# Patient Record
(demographics unavailable — no encounter records)

---

## 2024-10-29 NOTE — PHYSICAL EXAM
[de-identified] : General: No acute distress Mental: Alert and oriented x3 Eyes: Conjunctivitis not seen Chest: Symmetric chest rise, no audible wheezing Skin: Bilateral lower extremities absent from rashes and ulcers Abdomen: No distention  Bilateral knee: Skin: Clean, dry, intact  Inspection: Valgus malalignment, no masses, no swelling, small effusion.  Tenderness: no MJLT. no LJLT. No tenderness over the medial and lateral patella facets. No ttp medial/lateral epicondyle, patella tendon, tibial tubercle, pes anserinus, or proximal fibula.  ROM: -5 to 130 pain with deep flexion in both knees, crepitus Stability: Stable to varus and valgus, negative lachman. Negative anterior/posterior drawer.  Additional tests: Negative McMurrays test, negative Steinmann, Negative patellar grind test.   Strength: 5/5 Q/H/TA/GS/EHL, no atrophy  Neuro: Sensation intact to light touch throughout in dp/sp/tib/jack/saph nerve distributions Pulses: 2+ DP/PT pulses.  [de-identified] : Bilateral knee x-rays performed today. X-rays of the right knee including 4 views shows severe narrowing of the lateral joint space, bone-on-bone sclerosis and diffuse osteophytes, valgus alignment, patella at appropriate height and center position.  Kellgren-Mehrdad 4 X-rays of the left knee including 4 views shows severe narrowing of the lateral joint space, bone-on-bone sclerosis and diffuse osteophytes, valgus alignment.  Kellgren-Mehrdad 4

## 2024-10-29 NOTE — HISTORY OF PRESENT ILLNESS
[de-identified] : 80-year-old female presents for follow-up of bilateral knee degenerative arthritis. The pain substantially limits activities of daily living. Walking tolerance is reduced. Medication including NSAIDs, Tylenol, and activity modification have been minimally effective for a period lasting greater than three months in duration. Assistive devices and external support were not deemed by the patient to be helpful in improving their function.  She reports worsening pain in the left knee, with buckling, swelling, and some stiffness.  She is requesting repeat gel injections.

## 2024-10-29 NOTE — DISCUSSION/SUMMARY
[de-identified] : 80-year-old female with chronic bilateral knee pain due to advanced degenerative arthritis. I discussed the treatment of degenerative arthritis with the patient at length today. I described the spectrum of treatment from nonoperative modalities to total joint arthroplasty. Noninvasive and nonoperative treatment modalities include weight reduction, activity modification with low impact exercise, PRN use of acetaminophen or anti-inflammatory medication if tolerated, glucosamine/chondroitin supplements, and physical therapy. Further treatments can include corticosteroid injection and the use of hyaluronic acid injections. Definitive treatment can certainly include total joint arthroplasty also. The risks and benefits of each treatment options was discussed and all questions were answered.  We will start Euflexxa injections today bilateral knee. Injections performed as noted above. Follow-up 1 week

## 2024-10-29 NOTE — PHYSICAL EXAM
[de-identified] : General: No acute distress Mental: Alert and oriented x3 Eyes: Conjunctivitis not seen Chest: Symmetric chest rise, no audible wheezing Skin: Bilateral lower extremities absent from rashes and ulcers Abdomen: No distention  Bilateral knee: Skin: Clean, dry, intact  Inspection: Valgus malalignment, no masses, no swelling, small effusion.  Tenderness: no MJLT. no LJLT. No tenderness over the medial and lateral patella facets. No ttp medial/lateral epicondyle, patella tendon, tibial tubercle, pes anserinus, or proximal fibula.  ROM: -5 to 130 pain with deep flexion in both knees, crepitus Stability: Stable to varus and valgus, negative lachman. Negative anterior/posterior drawer.  Additional tests: Negative McMurrays test, negative Steinmann, Negative patellar grind test.   Strength: 5/5 Q/H/TA/GS/EHL, no atrophy  Neuro: Sensation intact to light touch throughout in dp/sp/tib/jack/saph nerve distributions Pulses: 2+ DP/PT pulses.  [de-identified] : Bilateral knee x-rays performed today. X-rays of the right knee including 4 views shows severe narrowing of the lateral joint space, bone-on-bone sclerosis and diffuse osteophytes, valgus alignment, patella at appropriate height and center position.  Kellgren-Mehrdad 4 X-rays of the left knee including 4 views shows severe narrowing of the lateral joint space, bone-on-bone sclerosis and diffuse osteophytes, valgus alignment.  Kellgren-Mehrdad 4

## 2024-10-29 NOTE — PROCEDURE
[de-identified] : Injection: Right knee joint Euflexxa #1 Indication: Arthritis   A discussion was had with the patient regarding this procedure and all questions were answered. All risks, benefits and alternatives were discussed. These included but were not limited to bleeding, infection, and allergic reaction. Alcohol was used to clean the skin, and betadine was used to sterilize and prep the area in the lateral aspect of the right knee. Ethyl chloride spray was then used as a topical anesthetic. A 22-gauge needle was used to inject 2cc of euflexxa into the knee. A sterile bandage was then applied. The patient tolerated the procedure well and there were no complications.  Injection: Left knee joint. Indication: Arthritis   A discussion was had with the patient regarding this procedure and all questions were answered. All risks, benefits and alternatives were discussed. These included but were not limited to bleeding, infection, and allergic reaction. Alcohol was used to clean the skin, and betadine was used to sterilize and prep the area in the lateral aspect of the left knee. Ethyl chloride spray was then used as a topical anesthetic. A 22-gauge needle was used to inject 4cc of Euflexxa into the knee. A sterile bandage was then applied. The patient tolerated the procedure well and there were no complications.  Lot #: [id number] Exp: 11/16/2025

## 2024-10-29 NOTE — DISCUSSION/SUMMARY
[de-identified] : 80-year-old female with chronic bilateral knee pain due to advanced degenerative arthritis. I discussed the treatment of degenerative arthritis with the patient at length today. I described the spectrum of treatment from nonoperative modalities to total joint arthroplasty. Noninvasive and nonoperative treatment modalities include weight reduction, activity modification with low impact exercise, PRN use of acetaminophen or anti-inflammatory medication if tolerated, glucosamine/chondroitin supplements, and physical therapy. Further treatments can include corticosteroid injection and the use of hyaluronic acid injections. Definitive treatment can certainly include total joint arthroplasty also. The risks and benefits of each treatment options was discussed and all questions were answered.  We will start Euflexxa injections today bilateral knee. Injections performed as noted above. Follow-up 1 week

## 2024-10-29 NOTE — HISTORY OF PRESENT ILLNESS
[de-identified] : 80-year-old female presents for follow-up of bilateral knee degenerative arthritis. The pain substantially limits activities of daily living. Walking tolerance is reduced. Medication including NSAIDs, Tylenol, and activity modification have been minimally effective for a period lasting greater than three months in duration. Assistive devices and external support were not deemed by the patient to be helpful in improving their function.  She reports worsening pain in the left knee, with buckling, swelling, and some stiffness.  She is requesting repeat gel injections.

## 2024-10-29 NOTE — PROCEDURE
Continue psychology visits  Psychiatrist located in [facility]   Stable condition   [de-identified] : Injection: Right knee joint Euflexxa #1 Indication: Arthritis   A discussion was had with the patient regarding this procedure and all questions were answered. All risks, benefits and alternatives were discussed. These included but were not limited to bleeding, infection, and allergic reaction. Alcohol was used to clean the skin, and betadine was used to sterilize and prep the area in the lateral aspect of the right knee. Ethyl chloride spray was then used as a topical anesthetic. A 22-gauge needle was used to inject 2cc of euflexxa into the knee. A sterile bandage was then applied. The patient tolerated the procedure well and there were no complications.  Injection: Left knee joint. Indication: Arthritis   A discussion was had with the patient regarding this procedure and all questions were answered. All risks, benefits and alternatives were discussed. These included but were not limited to bleeding, infection, and allergic reaction. Alcohol was used to clean the skin, and betadine was used to sterilize and prep the area in the lateral aspect of the left knee. Ethyl chloride spray was then used as a topical anesthetic. A 22-gauge needle was used to inject 4cc of Euflexxa into the knee. A sterile bandage was then applied. The patient tolerated the procedure well and there were no complications.  Lot #: [id number] Exp: 11/16/2025

## 2024-11-05 NOTE — PROCEDURE
[de-identified] : Injection: Right knee joint Euflexxa #2 Indication: Arthritis   A discussion was had with the patient regarding this procedure and all questions were answered. All risks, benefits and alternatives were discussed. These included but were not limited to bleeding, infection, and allergic reaction. Alcohol was used to clean the skin, and betadine was used to sterilize and prep the area in the lateral aspect of the right knee. Ethyl chloride spray was then used as a topical anesthetic. A 22-gauge needle was used to inject 2cc of Euflexxa into the knee. A sterile bandage was then applied. The patient tolerated the procedure well and there were no complications.  Injection: Left knee joint Euflexxa #2 Indication: Arthritis   A discussion was had with the patient regarding this procedure and all questions were answered. All risks, benefits and alternatives were discussed. These included but were not limited to bleeding, infection, and allergic reaction. Alcohol was used to clean the skin, and betadine was used to sterilize and prep the area in the lateral aspect of the left knee. Ethyl chloride spray was then used as a topical anesthetic. A 22-gauge needle was used to inject 2cc of Euflexxa into the knee. A sterile bandage was then applied. The patient tolerated the procedure well and there were no complications.

## 2024-11-19 NOTE — PROCEDURE
[de-identified] : Injection: Right knee joint Euflexxa #3 Indication: Arthritis   A discussion was had with the patient regarding this procedure and all questions were answered. All risks, benefits and alternatives were discussed. These included but were not limited to bleeding, infection, and allergic reaction. Alcohol was used to clean the skin, and betadine was used to sterilize and prep the area in the lateral aspect of the right knee. Ethyl chloride spray was then used as a topical anesthetic. A 22-gauge needle was used to inject 2cc of Euflexxa into the knee. A sterile bandage was then applied. The patient tolerated the procedure well and there were no complications.  Injection: Left knee joint Euflexxa #3 Indication: Arthritis   A discussion was had with the patient regarding this procedure and all questions were answered. All risks, benefits and alternatives were discussed. These included but were not limited to bleeding, infection, and allergic reaction. Alcohol was used to clean the skin, and betadine was used to sterilize and prep the area in the lateral aspect of the left knee. Ethyl chloride spray was then used as a topical anesthetic. A 22-gauge needle was used to inject 2cc of Euflexxa into the knee. A sterile bandage was then applied. The patient tolerated the procedure well and there were no complications.

## 2025-01-09 NOTE — HISTORY OF PRESENT ILLNESS
[de-identified] : 80 years old female presents to establish care new PCP, she use to be seen by DR MONAHAN, [place] for Hebrew speaking physician; refers h/o HTN, Alzheimer's , OA, offers no complaints today

## 2025-01-09 NOTE — HEALTH RISK ASSESSMENT
[Fair] :  ~his/her~ mood as fair [No] : No [No falls in past year] : Patient reported no falls in the past year [0] : 2) Feeling down, depressed, or hopeless: Not at all (0) [PHQ-2 Negative - No further assessment needed] : PHQ-2 Negative - No further assessment needed [Never] : Never [NO] : No [Patient reported mammogram was normal] : Patient reported mammogram was normal [Patient reported colonoscopy was normal] : Patient reported colonoscopy was normal [HIV test declined] : HIV test declined [Hepatitis C test declined] : Hepatitis C test declined [Behavioral] : behavioral [With Family] : lives with family [Retired] : retired [High School] : high school [] :  [# Of Children ___] : has [unfilled] children [Feels Safe at Home] : Feels safe at home [Reports changes in vision] : Reports changes in vision [Smoke Detector] : smoke detector [Carbon Monoxide Detector] : carbon monoxide detector [Seat Belt] :  uses seat belt [[id number]] : 0 [Change in mental status noted] : No change in mental status noted [Sexually Active] : not sexually active [Reports changes in hearing] : Reports no changes in hearing [Reports changes in dental health] : Reports no changes in dental health [MammogramDate] : 10/2024 [ColonoscopyDate] : 01/2021 [de-identified] : julia ANDRE [de-identified] : julia ANDRE

## 2025-01-09 NOTE — PLAN
[FreeTextEntry1] : 1. annual physical exam * routine general labs done * age appropriate health maintenance recommendations reviewed, discussed including healthy diet, regular exercise 2. HTN * c/w Irbesartan 300 mg, refilled 3. Alzheimer's disease * stable on donepezil and memantine, to continue * f/u with neurologist 4. breast cancer screening UTD with mammogram 5. colon cancer screening * FIT test * f/u in six months

## 2025-03-13 NOTE — CARDIOLOGY SUMMARY
[de-identified] : Sinus Rhythm  Low voltage in precordial leads. -RSR(V1) -nondiagnostic. [de-identified] : 2023 pharm stress normal SPECT  10/2024  Normal myocardial perfusion scan, with no evidence of infarction or inducible ischemia. [de-identified] : 3/2023 normal LV function  9/2024 normal LV function.

## 2025-03-13 NOTE — REVIEW OF SYSTEMS
[Dyspnea on exertion] : dyspnea during exertion [Heartburn] : heartburn [Anxiety] : anxiety [Negative] : Heme/Lymph

## 2025-03-13 NOTE — PHYSICAL EXAM
[Normal Rate] : normal [Rhythm Regular] : regular [Normal S1] : normal S1 [Normal S2] : normal S2 [No Gallop] : no gallop heard [I] : a grade 1 [No Pitting Edema] : no pitting edema present [1+] : left 1+ [Left Carotid Bruit] : no bruit heard over the left carotid [Right Carotid Bruit] : no bruit heard over the right carotid [No Edema] : no edema [No Cyanosis] : no cyanosis [Normal] : moves all extremities, no focal deficits, normal speech [Appears Anxious] : appears anxious [de-identified] : walks with cane [de-identified] : anxious

## 2025-03-13 NOTE — HISTORY OF PRESENT ILLNESS
[FreeTextEntry1] : 80 years old, with a history of hypertension, hyperlipidemia, dementia, on donepezil and memantine, venous insufficiency, for which she is status post prior venous stenting presents for a followup visit.    She is Irish speaking primarly. She is a poor historian overall.  Since her last visit more headaches and notes that her BP has been more elevated at times with SBP 170s.  At times she has palpitations when her pressure is high. She has a baseline general anxiety which at times worsens. She   denies any chest pain, PND, orthopnea, lower extremity edema, near syncope, syncope, stroke like symptoms. She has FORD with stairs which is unchanged.    Her lower extremity edema has remained stable if not slightly improved. Medication reconciliation performed. She is compliant with her medications.

## 2025-03-13 NOTE — DISCUSSION/SUMMARY
[FreeTextEntry1] : 80 year woman with a history as listed presents for a followup cardiac evaluation.  Cecilia is complaining of high BP and palpitations. Much of this may be reactive to her underlying anxiety. I will start her on Toprol 50mg Qday.  She will continue Irbesartan. She will try to maintain a BP log at home. Reducing dietary salt intake advised.  She had a negative nuclear stress in 10/2024.  She will followup with psych.  Exercise and diet counseling was performed in order to reduce her future cardiovascular risk. She will followup with me in 4-6 months or sooner if necessary.  [EKG obtained to assist in diagnosis and management of assessed problem(s)] : EKG obtained to assist in diagnosis and management of assessed problem(s)

## 2025-03-18 NOTE — HISTORY OF PRESENT ILLNESS
[FreeTextEntry1] :  80 years old female with HTN, Alzheimer's , OA (knee, shoulders), PAD s/p stent in right LE, GERD   Pt was seen 2/2025  Patient sees ortho for knee injection for knee OA, She reports she was told she has knee OA and needs surgery per patient.  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 with mild adjacent bursitis, partial tear at distal insertion of 4th plantar plate" - was told she needs surgery  Patient also seeing pain management for spine OA, reports had epidurals  Also reports shoulder pain due to previous rotator cuff injury  Reports numbness and tingling in her fingers at night   Patient denies joint pains, joint swelling, joint erythema/warmth, fever, chills, weight loss, nasopharyngeal ulcers, chest pain, abdominal pain, , cough, SOB, , rash, , Raynaud's, alopecia, dry mouth, dry skin, headaches, eye pain/redness, vision changes, myalgias, muscle weakness, , miscarriages, Hx of DVT/PEs.   Labs    2/2025  Labs  Neg NASIMA, RF, CCP, SSA, SSB, Santo, RNP, C3, C4,   ESR 32 normal for age, CRP <3  dsDNA 20  Urine pr/Cr 0.3      Imaging:  Xr shoulders, hand/wrists  Impression: Shoulders: No acute fracture or dislocation. Joint spaces are maintained. Hands/wrists: No acute fracture or dislocation. Severe bilateral basilar osteoarthrosis. Mild bilateral STT arthrosis. Mild joint space narrowing of the interphalangeal articulations. No osseous erosions or periosteal reaction.

## 2025-03-18 NOTE — PHYSICAL EXAM
[TextEntry] : GENERAL: Appears in no acute distress HEENT: EOMI. No conjunctival erythema. Moist mucous membranes. No nasopharyngeal ulcers NECK: Supple, no cervical lymphadenopathy CARDIOVASCULAR: RRR. S1, S2 auscultated. PULMONARY: Clear to auscultation b/l, MSK: No active synovitis, , erythema, or warmth. No joint tenderness to palpation. Flexion deformity 5th digit b/l No crepitus. ROM of shoulders limited due to pain SKIN: No lesions or rashes NEURO: No focal deficits. Motor strength 5/5 in major muscle groups of b/l UE and LE. Sensation to soft touch intact in major dermatomes of b/l UE and LE. PSYCH: Normal affect and thought process.

## 2025-03-18 NOTE — ASSESSMENT
[FreeTextEntry1] :  80 years old female with HTN, Alzheimer's , OA (knee, shoulders), PAD s/p stent in right LE, GERD   Patient sees ortho for knee injection for knee OA, She reports she was told she has knee OA and needs surgery per patient.  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 Based on existing labs, patient does not have anemia, leukopenia, thrombocytopenia, kidney disease. Exam without synovitis, rashes, changes of Raynauds.  Work up for underlying CTD nonrevealing. dsDNA low positive of no clinical significance. There is no suspicion for underlying CTD.  Advised better control of bp with her PCP/cardiologist (has some mild proteinuria which is likely 2/2 to high bp) I suspect pain is from OA and component myofascial pain as well  She is seeing ortho for knee OA and has had injection recently. Advised to also discuss shoulder OA with them (seen along with rotator cuff tear in MRI 2023)  She is seeing foot ortho for neuroma /partial tear at distal insertion of 4th plantar plate in right side - was told she might need surgery  Last time given PT for shoulder - encouraged to do PT for her shoulders Hand OA- paraffin wax, squeeze ball  Follow up PRN   Total time spent in review of patient history, clinical exam, management, counseling, and plan of care:  30min

## 2025-04-29 NOTE — PROCEDURE
[de-identified] : Injection: Right knee joint Euflexxa #1 Indication: Arthritis   A discussion was had with the patient regarding this procedure and all questions were answered. All risks, benefits and alternatives were discussed. These included but were not limited to bleeding, infection, and allergic reaction. Alcohol was used to clean the skin, and betadine was used to sterilize and prep the area in the lateral aspect of the right knee. Ethyl chloride spray was then used as a topical anesthetic. A 22-gauge needle was used to inject 2cc of Euflexxa into the knee. A sterile bandage was then applied. The patient tolerated the procedure well and there were no complications.  Injection: Left knee joint Euflexxa #1 Indication: Arthritis   A discussion was had with the patient regarding this procedure and all questions were answered. All risks, benefits and alternatives were discussed. These included but were not limited to bleeding, infection, and allergic reaction. Alcohol was used to clean the skin, and betadine was used to sterilize and prep the area in the lateral aspect of the left knee. Ethyl chloride spray was then used as a topical anesthetic. A 22-gauge needle was used to inject 2cc of Euflexxa into the knee. A sterile bandage was then applied. The patient tolerated the procedure well and there were no complications.  Lot: [id number] EXP: 04/19/2026

## 2025-04-29 NOTE — PHYSICAL EXAM
[Antalgic] : antalgic [Cane] : ambulates with cane [de-identified] : General: No acute distress Mental: Alert and oriented x3 Eyes: Conjunctivitis not seen Chest: Symmetric chest rise, no audible wheezing Skin: Bilateral lower extremities absent from rashes and ulcers Abdomen: No distention  Bilateral knee: Skin: Clean, dry, intact  Inspection: Valgus malalignment, no masses, no swelling, small effusion.  Tenderness: no MJLT. no LJLT. No tenderness over the medial and lateral patella facets. No ttp medial/lateral epicondyle, patella tendon, tibial tubercle, pes anserinus, or proximal fibula.  ROM: -5 to 130 pain with deep flexion in both knees, crepitus Stability: Stable to varus and valgus, negative lachman. Negative anterior/posterior drawer.  Additional tests: Negative McMurrays test, negative Steinmann, Negative patellar grind test.   Strength: 5/5 Q/H/TA/GS/EHL, no atrophy  Neuro: Sensation intact to light touch throughout in dp/sp/tib/jack/saph nerve distributions Pulses: 2+ DP/PT pulses.

## 2025-04-29 NOTE — HISTORY OF PRESENT ILLNESS
[de-identified] : 80-year-old female presents for follow-up of bilateral knee degenerative arthritis. The pain substantially limits activities of daily living. Walking tolerance is reduced. Medication including NSAIDs, Tylenol, and activity modification have been minimally effective for a period lasting greater than three months in duration. Assistive devices and external support were not deemed by the patient to be helpful in improving their function.  She reports worsening pain in the left knee, with buckling, swelling, and some stiffness.  She has had relief of pain with prior gel injections.  She does not want any cortisone injections.

## 2025-04-29 NOTE — DISCUSSION/SUMMARY
[de-identified] : 80-year-old female with chronic bilateral knee pain due to advanced degenerative arthritis and valgus alignment. I discussed the treatment of degenerative arthritis with the patient at length today. I described the spectrum of treatment from nonoperative modalities to total joint arthroplasty. Noninvasive and nonoperative treatment modalities include weight reduction, activity modification with low impact exercise, PRN use of acetaminophen or anti-inflammatory medication if tolerated, glucosamine/chondroitin supplements, and physical therapy. Further treatments can include corticosteroid injection and the use of hyaluronic acid injections. Definitive treatment can certainly include total joint arthroplasty also. The risks and benefits of each treatment options was discussed and all questions were answered.  She would like to continue nonoperative management. We will start Euflexxa injections today bilateral knee. Injections performed as noted above. Follow-up 1 week

## 2025-05-06 NOTE — PROCEDURE
[de-identified] : Injection: Right knee joint Euflexxa #2 Indication: Arthritis   A discussion was had with the patient regarding this procedure and all questions were answered. All risks, benefits and alternatives were discussed. These included but were not limited to bleeding, infection, and allergic reaction. Alcohol was used to clean the skin, and betadine was used to sterilize and prep the area in the lateral aspect of the right knee. Ethyl chloride spray was then used as a topical anesthetic. A 22-gauge needle was used to inject 2cc of Euflexxa into the knee. A sterile bandage was then applied. The patient tolerated the procedure well and there were no complications.  Injection: Left knee joint Euflexxa #2 Indication: Arthritis   A discussion was had with the patient regarding this procedure and all questions were answered. All risks, benefits and alternatives were discussed. These included but were not limited to bleeding, infection, and allergic reaction. Alcohol was used to clean the skin, and betadine was used to sterilize and prep the area in the lateral aspect of the left knee. Ethyl chloride spray was then used as a topical anesthetic. A 22-gauge needle was used to inject 2cc of Euflexxa into the knee. A sterile bandage was then applied. The patient tolerated the procedure well and there were no complications.  Lot: [id number] EXP: 04/19/2026

## 2025-05-13 NOTE — PROCEDURE
[de-identified] : Injection: Right knee joint Euflexxa #3 Indication: Arthritis   A discussion was had with the patient regarding this procedure and all questions were answered. All risks, benefits and alternatives were discussed. These included but were not limited to bleeding, infection, and allergic reaction. Alcohol was used to clean the skin, and betadine was used to sterilize and prep the area in the lateral aspect of the right knee. Ethyl chloride spray was then used as a topical anesthetic. A 22-gauge needle was used to inject 2cc of Euflexxa into the knee. A sterile bandage was then applied. The patient tolerated the procedure well and there were no complications.  Injection: Left knee joint Euflexxa #3 Indication: Arthritis   A discussion was had with the patient regarding this procedure and all questions were answered. All risks, benefits and alternatives were discussed. These included but were not limited to bleeding, infection, and allergic reaction. Alcohol was used to clean the skin, and betadine was used to sterilize and prep the area in the lateral aspect of the left knee. Ethyl chloride spray was then used as a topical anesthetic. A 22-gauge needle was used to inject 2cc of Euflexxa into the knee. A sterile bandage was then applied. The patient tolerated the procedure well and there were no complications.  Lot: [id number] EXP: 04/19/2026

## 2025-06-10 NOTE — PLAN
[FreeTextEntry1] : 1. HTN * c/w Irbesartan and metoprolol, refilled 2. Alzheimer's disease * c/w donepezil 3. generalized osteoarthritis * referral for PT for knee and shoulder pain * c/w NSAID as needed  4. hypercholesterolemia * low cholesterol, low triglycerides diet, dietary counseling given; dietary avoidance discussed; diet and exercise reviewed with patient * referral for lab fasting lipid panel 5. prediabetes * Dietary counseling given, dietary avoidance discussed, diet and exercise reviewed with patient; patient reminded of importance of aerobic exercise, weight control, dietary compliance and regular glucose monitoring * referral for lab cmp, a1c 6. GERD * c/w pantoprazole 40 mg, refilled * f/u in six months

## 2025-06-10 NOTE — HISTORY OF PRESENT ILLNESS
[FreeTextEntry1] : follow up Interview and discussion conducted in Yoruba by Yoruba speaking physician  [de-identified] : 80 years old female with HTN, Hypercholesterolemia, prediabetes, Alzheimer's disease presents for follow up, accompanied by home health aid, last seen in January, she complaints of chronic generalized osteoarthritic pain

## 2025-06-25 NOTE — PHYSICAL EXAM
[Chaperoned Physical Exam] : A chaperone was present in the examining room during all aspects of the physical examination. [de-identified] : hypertonus mild. mild referred tenderness to bladder,. Psoas trigger  [FreeTextEntry2] :   Cecelia Forbes RN

## 2025-06-25 NOTE — PHYSICAL EXAM
[Chaperoned Physical Exam] : A chaperone was present in the examining room during all aspects of the physical examination. [de-identified] : hypertonus mild. mild referred tenderness to bladder,. Psoas trigger  [FreeTextEntry2] :   Cecelia Forbes RN

## 2025-06-25 NOTE — HISTORY OF PRESENT ILLNESS
[FreeTextEntry1] : 80-year-old female presents for evaluation of lower abdominal pain.  History of recurrent urinary tract infections as well seen in the past by Dr. Hernandez MRI urogram of 2023 reviewed.  Feels when she holds urine her bladder hurts.  Also reports mixed urinary incontinence.

## 2025-06-25 NOTE — ASSESSMENT
[FreeTextEntry1] : Recurrent UTI, OAB, Constipation Mixed UI  CT urogram with contrast allergy protocol Medrol 32 mg 12 hrs before then 2 hours before and Benadryl Mirabegron 25 mg daily Warm baths Consider pelvic floor therapy follow up cystoscopy and UDS Fruit and veggie oral supplements Increase fiber       I spent the time noted on the day of this patient encounter preparing for, providing and documenting the above service. I have counseled and educated the patient on the differential, workup, disease course, and treatment/management plan. I have reviewd any pertinent images. Education was provided to the patient during this encounter. All questions and concerns were answered and addressed in detail.    More than 50% of the encounter was spent counseling the patient on differential, workup, disease course and treatment/management. Education was provided to the patient during this encounter. All questions and concerns were addressed and answered. The patient verbalized understanding and agreed to the plan.

## 2025-07-17 NOTE — CARDIOLOGY SUMMARY
[de-identified] : Sinus Rhythm  Low voltage in precordial leads. -RSR(V1) -nondiagnostic. [de-identified] : 2023 pharm stress normal SPECT  10/2024  Normal myocardial perfusion scan, with no evidence of infarction or inducible ischemia. [de-identified] : 3/2023 normal LV function  9/2024 normal LV function.

## 2025-07-17 NOTE — PHYSICAL EXAM
[Normal Rate] : normal [Rhythm Regular] : regular [Normal S1] : normal S1 [Normal S2] : normal S2 [No Gallop] : no gallop heard [I] : a grade 1 [No Pitting Edema] : no pitting edema present [1+] : left 1+ [Right Carotid Bruit] : no bruit heard over the right carotid [Left Carotid Bruit] : no bruit heard over the left carotid [No Edema] : no edema [No Cyanosis] : no cyanosis [Normal] : moves all extremities, no focal deficits, normal speech [Appears Anxious] : appears anxious [de-identified] : walks with cane [de-identified] : anxious

## 2025-07-17 NOTE — HISTORY OF PRESENT ILLNESS
[FreeTextEntry1] : 80 years old, with a history of hypertension, hyperlipidemia, dementia, on donepezil and memantine, venous insufficiency, for which she is status post prior venous stenting, EMILY not using CPAP presents for a followup visit.    She is Yakut speaking primary. She is a poor historian overall.  Since her last visit, she has been feeling more tired. She has admitted she was previously diagnosed with sleep apnea but has not been using a CPAP.  She has states that her BP has been high.   At times she has palpitations when her pressure is high. She has a baseline general anxiety which at times worsens. She   denies any chest pain, PND, orthopnea, lower extremity edema, near syncope, syncope, stroke like symptoms. She has FORD with stairs which is unchanged.    Her lower extremity edema has remained stable if not slightly improved. Medication reconciliation performed. She is compliant with her medications.

## 2025-07-17 NOTE — DISCUSSION/SUMMARY
[FreeTextEntry1] : 80 year woman with a history as listed presents for a followup cardiac evaluation.  Cecilia is complaining of more fatigue and trouble sleeping. She has underlying EMILY. SHe needs to see pulmonary to assess her EMILY. Her blood pressure has a reactive component and is reactive to her underlying anxiety.  She will continue Toprol 50mg Qday.  She will continue Irbesartan. She will try to maintain a BP log at home. Reducing dietary salt intake advised.  She had a negative nuclear stress in 10/2024. She will follow-up with psych.  Exercise and diet counseling was performed in order to reduce her future cardiovascular risk. She will followup with me in 6 months or sooner if necessary.   [EKG obtained to assist in diagnosis and management of assessed problem(s)] : EKG obtained to assist in diagnosis and management of assessed problem(s)

## 2025-07-18 NOTE — HISTORY OF PRESENT ILLNESS
[FreeTextEntry1] : 80-year-old female presents for cystoscopy for bladder pain.  Patient has elected to hold off CT urogram for now Patient with urgency incontinence on mirabegron 25 mg unsure of improvement with thinks it is too soon to tell. Risk-benefit symptoms are explained she consents and agrees to proceed with cystoscopy

## 2025-07-18 NOTE — PHYSICAL EXAM
[Normal Appearance] : normal appearance [Well Groomed] : well groomed [General Appearance - In No Acute Distress] : no acute distress [Edema] : no peripheral edema [Respiration, Rhythm And Depth] : normal respiratory rhythm and effort [Exaggerated Use Of Accessory Muscles For Inspiration] : no accessory muscle use [Abdomen Soft] : soft [Abdomen Tenderness] : non-tender [Costovertebral Angle Tenderness] : no ~M costovertebral angle tenderness [Urinary Bladder Findings] : the bladder was normal on palpation [Normal Station and Gait] : the gait and station were normal for the patient's age [] : no rash [No Focal Deficits] : no focal deficits [Oriented To Time, Place, And Person] : oriented to person, place, and time [Affect] : the affect was normal [Mood] : the mood was normal [No Palpable Adenopathy] : no palpable adenopathy [Chaperoned Physical Exam] : A chaperone was present in the examining room during all aspects of the physical examination. [MA] : MA [de-identified] : Positive incontinence with Valsalva full bladder approximately 400 mL .  Left lower labia majora lesion noted suspicious for HSV lesion.  When questioned the patient did state that it was painful initially it is currently healing and she has been applying Neosporin [FreeTextEntry2] : Anaid Bales MA

## 2025-07-18 NOTE — ASSESSMENT
[FreeTextEntry1] : 80-year-old female with stress urinary incontinence with Valsalva likely mixed with urge incontinence as well.  Will continue on mirabegron Urodynamic testing pending Cystoscopy normal bladder bladder capacity 400 mL minimal trigonitis Okay to forego CT scan at this time  Discussed treatment options for stress incontinence including bulking and sling.  Patient is aware of treatment for overactive bladder and urgency incontinence which includes medical management Botox and neuromodulation. Will we discussed treatment options once urodynamic testing is performed  Valtrex 1 g twice daily on initiation of vulvar lesion likely HSV and then once a day for 5 days.  HSV 1 to blood work sent